# Patient Record
Sex: FEMALE | Race: WHITE | NOT HISPANIC OR LATINO | URBAN - METROPOLITAN AREA
[De-identification: names, ages, dates, MRNs, and addresses within clinical notes are randomized per-mention and may not be internally consistent; named-entity substitution may affect disease eponyms.]

---

## 2023-11-27 ENCOUNTER — OFFICE (OUTPATIENT)
Dept: URBAN - METROPOLITAN AREA CLINIC 72 | Facility: CLINIC | Age: 68
End: 2023-11-27

## 2023-11-27 VITALS
DIASTOLIC BLOOD PRESSURE: 88 MMHG | HEIGHT: 63 IN | SYSTOLIC BLOOD PRESSURE: 124 MMHG | HEART RATE: 72 BPM | WEIGHT: 143 LBS

## 2023-11-27 DIAGNOSIS — J44.9 CHRONIC OBSTRUCTIVE PULMONARY DISEASE, UNSPECIFIED: ICD-10-CM

## 2023-11-27 DIAGNOSIS — Z86.010 PERSONAL HISTORY OF COLONIC POLYPS: ICD-10-CM

## 2023-11-27 PROCEDURE — 99203 OFFICE O/P NEW LOW 30 MIN: CPT | Performed by: NURSE PRACTITIONER

## 2023-12-14 ENCOUNTER — OFFICE (OUTPATIENT)
Dept: URBAN - METROPOLITAN AREA PATHOLOGY 10 | Facility: PATHOLOGY | Age: 68
End: 2023-12-14
Payer: MEDICARE

## 2023-12-14 ENCOUNTER — AMBULATORY SURGICAL CENTER (OUTPATIENT)
Dept: URBAN - METROPOLITAN AREA SURGERY 19 | Facility: SURGERY | Age: 68
End: 2023-12-14
Payer: MEDICARE

## 2023-12-14 DIAGNOSIS — Z86.010 PERSONAL HISTORY OF COLONIC POLYPS: ICD-10-CM

## 2023-12-14 DIAGNOSIS — D12.8 BENIGN NEOPLASM OF RECTUM: ICD-10-CM

## 2023-12-14 LAB
COLONOSCOPY STUDY: (no result)
COLONOSCOPY STUDY: (no result)

## 2023-12-14 PROCEDURE — 88305 TISSUE EXAM BY PATHOLOGIST: CPT | Mod: TC | Performed by: STUDENT IN AN ORGANIZED HEALTH CARE EDUCATION/TRAINING PROGRAM

## 2023-12-14 PROCEDURE — 45385 COLONOSCOPY W/LESION REMOVAL: CPT | Mod: PT | Performed by: INTERNAL MEDICINE

## 2024-12-19 ENCOUNTER — OFFICE (OUTPATIENT)
Dept: URBAN - METROPOLITAN AREA CLINIC 72 | Facility: CLINIC | Age: 69
End: 2024-12-19
Payer: MEDICARE

## 2024-12-19 VITALS
WEIGHT: 167 LBS | SYSTOLIC BLOOD PRESSURE: 130 MMHG | HEIGHT: 63 IN | HEART RATE: 74 BPM | DIASTOLIC BLOOD PRESSURE: 90 MMHG

## 2024-12-19 DIAGNOSIS — Z86.0100 PERSONAL HISTORY OF COLON POLYPS, UNSPECIFIED: ICD-10-CM

## 2024-12-19 DIAGNOSIS — R12 HEARTBURN: ICD-10-CM

## 2024-12-19 DIAGNOSIS — R13.12 DYSPHAGIA, OROPHARYNGEAL PHASE: ICD-10-CM

## 2024-12-19 PROCEDURE — 99214 OFFICE O/P EST MOD 30 MIN: CPT | Performed by: INTERNAL MEDICINE

## 2024-12-19 RX ORDER — PANTOPRAZOLE SODIUM 40 MG/1
TABLET, DELAYED RELEASE ORAL
Qty: 90 | Refills: 3 | Status: ACTIVE
Start: 2024-12-19

## 2024-12-19 NOTE — SERVICENOTES
Our goal is to partner with you to improve your health and well being. It is important for you to complete necessary testing and follow the instructions given to you at your clinic visit. Our office will call you within 2 weeks with results of any testing but you may also call sooner to obtain results [phone].   If you have any questions or concerns please feel free to call.  We take your care very seriously and we thank you for your trust!
- , start pantoprazole 40 mg once daily 30 min before first meal of the day
- schedule EGD
- follow up with any NP after procedures.

## 2024-12-19 NOTE — SERVICEHPINOTES
Pretty Mena   is seen today for a follow-up visit.  
brollowed by Dr. Simon for GERD with esophagitis, history of multiple adenomatous polyps. She was last seen in 1/2020 for abdominal pain.She returns today after a lengthy absence, 11/27/2023brShe is overall doing well from a GI perspective. She was treated with antibiotic for UTI and developed some constipation. She currently takes Benefiber one dose daily in her coffee. She recently increased that to twice a day. She has a bowel movement daily. She denies abdominal pain, heartburn, nausea, vomiting, weight loss or signs of GI bleeding.brShe stopped drinking alcohol 9/9/2023.. br    br  Plan from last visit:
br \shcedule colonoscopy Interval History:  12/19/2024  
br 
12/23 colon with an ssa and a ta-repeat due 12/28br   She saw her pcp for atypical chest pain, it was just one episode.  She did not go to the ER. 
brThe patient presents with a chief complaint of chest pain and difficulty swallowing. The chest pain was experienced several years ago and has recently returned, with a burning sensation from the chest to the throat, particularly at night. The patient reports waking up in the middle of the night due to this pain. Additionally, the patient experiences a sensation of food getting stuck in the throat and has been dealing with choking episodes for several months, which have been progressively worsening.The patient has a history of rheumatoid arthritis and is currently on Tremfya. They were recently prescribed a muscle relaxer for shoulder pain by their rheumatologist. The patient also has a history of psoriasis, which has been clear for the past five to six years.The patient denies using any inhalers or fluticasone nasal spray, stating that such medications cause headaches. The patient's last visit to a cardiologist was a long time ago, and they report no chest pain during physical activities such as walking, climbing stairs, or cleaning the house.The patient recently underwent an esophagram, and the results were inconclusive. The patient has been experiencing coughing episodes when eating and even when swallowing saliva. They also developed a cough on Thanksgiving Day, which lasted for two days and then resolved. The patient is not currently on any medication for acid reflux. My nurse has reviewed and updated the medication list with the patient (medication reconciliation). I have also reviewed the medication list. New updates were made to the patient's medical, social and family history. Pertinent details are also noted above in the HPI.br visited="true"

## 2025-01-22 ENCOUNTER — AMBULATORY SURGICAL CENTER (OUTPATIENT)
Dept: URBAN - METROPOLITAN AREA SURGERY 19 | Facility: SURGERY | Age: 70
End: 2025-01-22
Payer: MEDICARE

## 2025-01-22 ENCOUNTER — OFFICE (OUTPATIENT)
Dept: URBAN - METROPOLITAN AREA PATHOLOGY 10 | Facility: PATHOLOGY | Age: 70
End: 2025-01-22
Payer: MEDICARE

## 2025-01-22 DIAGNOSIS — R07.9 CHEST PAIN, UNSPECIFIED: ICD-10-CM

## 2025-01-22 DIAGNOSIS — K20.90 ESOPHAGITIS, UNSPECIFIED WITHOUT BLEEDING: ICD-10-CM

## 2025-01-22 DIAGNOSIS — R13.10 DYSPHAGIA, UNSPECIFIED: ICD-10-CM

## 2025-01-22 DIAGNOSIS — K44.9 DIAPHRAGMATIC HERNIA WITHOUT OBSTRUCTION OR GANGRENE: ICD-10-CM

## 2025-01-22 PROCEDURE — 88312 SPECIAL STAINS GROUP 1: CPT | Performed by: PATHOLOGY

## 2025-01-22 PROCEDURE — 88313 SPECIAL STAINS GROUP 2: CPT | Performed by: PATHOLOGY

## 2025-01-22 PROCEDURE — 88305 TISSUE EXAM BY PATHOLOGIST: CPT | Performed by: PATHOLOGY

## 2025-01-22 PROCEDURE — 43239 EGD BIOPSY SINGLE/MULTIPLE: CPT | Performed by: INTERNAL MEDICINE

## 2025-02-10 ENCOUNTER — OFFICE (OUTPATIENT)
Dept: URBAN - METROPOLITAN AREA CLINIC 72 | Facility: CLINIC | Age: 70
End: 2025-02-10

## 2025-02-10 VITALS
WEIGHT: 168 LBS | HEIGHT: 63 IN | OXYGEN SATURATION: 99 % | SYSTOLIC BLOOD PRESSURE: 120 MMHG | DIASTOLIC BLOOD PRESSURE: 78 MMHG | HEART RATE: 66 BPM

## 2025-02-10 DIAGNOSIS — L43.9 LICHEN PLANUS, UNSPECIFIED: ICD-10-CM

## 2025-02-10 DIAGNOSIS — R13.19 OTHER DYSPHAGIA: ICD-10-CM

## 2025-02-10 DIAGNOSIS — Z86.0100 PERSONAL HISTORY OF COLON POLYPS, UNSPECIFIED: ICD-10-CM

## 2025-02-10 PROCEDURE — 99214 OFFICE O/P EST MOD 30 MIN: CPT | Performed by: INTERNAL MEDICINE

## 2025-02-10 RX ORDER — PANTOPRAZOLE SODIUM 20 MG/1
TABLET, DELAYED RELEASE ORAL
Qty: 90 | Refills: 3 | Status: ACTIVE
Start: 2025-02-10

## 2025-02-10 RX ORDER — FLUTICASONE PROPIONATE 220 UG/1
AEROSOL, METERED RESPIRATORY (INHALATION)
Qty: 2 | Refills: 2 | Status: ACTIVE
Start: 2025-02-10

## 2025-02-10 RX ORDER — PANTOPRAZOLE SODIUM 40 MG/1
TABLET, DELAYED RELEASE ORAL
Qty: 90 | Refills: 3 | Status: COMPLETED
Start: 2024-12-19 | End: 2025-02-10

## 2025-06-12 ENCOUNTER — OFFICE (OUTPATIENT)
Dept: URBAN - METROPOLITAN AREA CLINIC 72 | Facility: CLINIC | Age: 70
End: 2025-06-12
Payer: MEDICARE

## 2025-06-12 VITALS
WEIGHT: 169 LBS | SYSTOLIC BLOOD PRESSURE: 128 MMHG | DIASTOLIC BLOOD PRESSURE: 70 MMHG | HEART RATE: 75 BPM | HEIGHT: 62 IN

## 2025-06-12 DIAGNOSIS — Z82.49 FAMILY HISTORY OF ISCHEMIC HEART DISEASE AND OTHER DISEASES: ICD-10-CM

## 2025-06-12 DIAGNOSIS — Z09 ENCOUNTER FOR FOLLOW-UP EXAMINATION AFTER COMPLETED TREATMEN: ICD-10-CM

## 2025-06-12 DIAGNOSIS — Z86.0101 PERSONAL HISTORY OF ADENOMATOUS AND SERRATED COLON POLYPS: ICD-10-CM

## 2025-06-12 DIAGNOSIS — L43.9 LICHEN PLANUS, UNSPECIFIED: ICD-10-CM

## 2025-06-12 DIAGNOSIS — R13.19 OTHER DYSPHAGIA: ICD-10-CM

## 2025-06-12 DIAGNOSIS — K44.9 DIAPHRAGMATIC HERNIA WITHOUT OBSTRUCTION OR GANGRENE: ICD-10-CM

## 2025-06-12 DIAGNOSIS — J44.9 CHRONIC OBSTRUCTIVE PULMONARY DISEASE, UNSPECIFIED: ICD-10-CM

## 2025-06-12 DIAGNOSIS — R07.89 OTHER CHEST PAIN: ICD-10-CM

## 2025-06-12 PROCEDURE — 99214 OFFICE O/P EST MOD 30 MIN: CPT | Performed by: INTERNAL MEDICINE

## 2025-06-12 NOTE — SERVICEHPINOTES
Pretty Mena   is seen today for a follow-up visit.  
br
ollowed by Dr. Simon for GERD with esophagitis, history of multiple adenomatous polyps.She was last seen in 1/2020 for abdominal pain.She returns today after a lengthy absence, 11/27/2023brShe is overall doing well from a GI perspective. She was treated with antibiotic for UTI and developed some constipation. She currently takes Benefiber one dose daily in her coffee. She recently increased that to twice a day. She has a bowel movement daily. She denies abdominal pain, heartburn, nausea, vomiting, weight loss or signs of GI bleeding.brShe stopped drinking alcohol 9/9/2023..brInterval History:12/19/2024br12/23 colon with an ssa and a ta-repeat due 12/28brKelsie saw her pcp for atypical chest pain, it was just one episode. She did not go to the ER.brThe patient presents with a chief complaint of chest pain and difficulty swallowing. The chest pain was experienced several years ago and has recently returned, with a burning sensation from the chest to the throat, particularly at night. The patient reports waking up in the middle of the night due to this pain. Additionally, the patient experiences a sensation of food getting stuck in the throat and has been dealing with choking episodes for several months, which have been progressively worsening.The patient has a history of rheumatoid arthritis and is currently on Tremfya. They were recently prescribed a muscle relaxer for shoulder pain by their rheumatologist. The patient also has a history of psoriasis, which has been clear for the past five to six years.The patient denies using any inhalers or fluticasone nasal spray, stating that such medications cause headaches. The patient's last visit to a cardiologist was a long time ago, and they report no chest pain during physical activities such as walking, climbing stairs, or cleaning the house.The patient recently underwent an esophagram, and the results were inconclusive. The patient has been experiencing coughing episodes when eating and even when swallowing saliva. They also developed a cough on Thanksgiving Day, which lasted for two days and then resolved. The patient is not currently on any medication for acid reflux.brInterval History:2/10/2025Patient reports experiencing chest pain, primarily occurring at night around 2-3 AM when lying down. The pain has never occurred during daytime. Patient's primary care physician did not believe it was cardiac in nature.Patient complains of soreness in the tongue, which is constant. They report difficulty eating certain foods, including ice cream, due to a burning sensation. Patient also mentions occasional choking, particularly in the evenings, describing a sensation of something large pressing through the esophagus, which is painful.Patient notes skin issues, including bumps along the hairline that have been present for about 6 months. These bumps are occasionally itchy but mainly bothersome due to their presence. A previous biopsy of a bump on the back was reportedly unremarkable. Patient has a history of psoriasis, for which they take Tremfya.    br  Plan from last visit:
br lichen planus is the condition you have in your esophagus. you should discuss this diagnosis with your dermatologist, GYN doctor and dentistbr- , You are being prescribed fluticasone 220 mcg for the treatment of lichen planus. You should swallow two puffs twice a day for 6-8 weeks. You should swallow the puffs. DO NOT INHALE THEM. Once you take the two puffs and swallow them, rinse your mouth out with water and spit it out (do not swallow it or you will rinse off the medication in your esophagus). This will help prevent oral thrush. Do not eat or drink for 30 min. If you want additional instructions you can go to youtube and type "how to use an inhaler for EoE" you will find an excellent step by step video put out by the [facility]. You need to complete the full 6-8 week course of this medication. Do not stop taking it even if symptoms improve. Let my office know if you have any concerning side effects or questions.br- follow up in 3 monthsbr- repeat EGD in 1 yearbr- stop pantoprazole 40 mg, start pantoprazole 20 mg Interval History:  6/11/2025   
br   She feels better. 
br SHe doesnt choke when she swallows.  Sometimes she feels like something is hung in her zenkers (whcih she was told she had on a swallow eval).  She is taking budesonide with a little honey and water.  She is still doing it,  She wasnt doing it correct initially but now has been on it twice a day for at least 6 weeks.  br
br The last couple days she has had some severe mid sternal chest pain.  Sometimes it is at night, somettime it is in the day, not associated with activity.  
br
lawson She has not seen the cardiologist.  She was told it did not sound cardiac.  br
lawson She also has grovers's disease.  
br The chest pain is only about once a week on average.  It lasta about. up 5 min max
br
br She is on pantoprazole 20 and does not have heartburn.  My nurse has reviewed and updated the medication list with the patient (medication reconciliation). I have also reviewed the medication list. New updates were made to the patient's medical, social and family history. Pertinent details are also noted above in the HPI.br visited="true"

## 2025-06-12 NOTE — SERVICENOTES
Our goal is to partner with you to improve your health and well being. It is important for you to complete necessary testing and follow the instructions given to you at your clinic visit. Our office will call you within 2 weeks with results of any testing but you may also call sooner to obtain results [phone].   If you have any questions or concerns please feel free to call.  We take your care very seriously and we thank you for your trust!
- when the pain comes on, try drinking some warm water and taking some malox and see if that helps calm things down
- cardiology consults
- I only want you to do the steroids for 6-8 weeks total then stop. 
- let me know if the swallowing issues comes back once you stop
- don't eat or drink for 30 min after taking the steroid.